# Patient Record
Sex: MALE | Race: WHITE
[De-identification: names, ages, dates, MRNs, and addresses within clinical notes are randomized per-mention and may not be internally consistent; named-entity substitution may affect disease eponyms.]

---

## 2017-10-02 ENCOUNTER — HOSPITAL ENCOUNTER (INPATIENT)
Dept: HOSPITAL 59 - MEDSURG | Age: 72
LOS: 1 days | Discharge: HOME | DRG: 470 | End: 2017-10-03
Attending: ORTHOPAEDIC SURGERY | Admitting: ORTHOPAEDIC SURGERY
Payer: COMMERCIAL

## 2017-10-02 DIAGNOSIS — J44.9: ICD-10-CM

## 2017-10-02 DIAGNOSIS — E11.9: ICD-10-CM

## 2017-10-02 DIAGNOSIS — E78.00: ICD-10-CM

## 2017-10-02 DIAGNOSIS — I10: ICD-10-CM

## 2017-10-02 DIAGNOSIS — F17.200: ICD-10-CM

## 2017-10-02 DIAGNOSIS — M17.11: Primary | ICD-10-CM

## 2017-10-02 LAB
ABO GROUP: (no result)
ANTIBODY SCREEN: NEGATIVE
RH TYPE: POSITIVE

## 2017-10-02 PROCEDURE — 90686 IIV4 VACC NO PRSV 0.5 ML IM: CPT

## 2017-10-02 PROCEDURE — 94761 N-INVAS EAR/PLS OXIMETRY MLT: CPT

## 2017-10-02 PROCEDURE — 86900 BLOOD TYPING SEROLOGIC ABO: CPT

## 2017-10-02 PROCEDURE — 82948 REAGENT STRIP/BLOOD GLUCOSE: CPT

## 2017-10-02 PROCEDURE — 36416 COLLJ CAPILLARY BLOOD SPEC: CPT

## 2017-10-02 PROCEDURE — 97165 OT EVAL LOW COMPLEX 30 MIN: CPT

## 2017-10-02 PROCEDURE — 85018 HEMOGLOBIN: CPT

## 2017-10-02 PROCEDURE — 86901 BLOOD TYPING SEROLOGIC RH(D): CPT

## 2017-10-02 PROCEDURE — 97116 GAIT TRAINING THERAPY: CPT

## 2017-10-02 PROCEDURE — 85014 HEMATOCRIT: CPT

## 2017-10-02 PROCEDURE — 86850 RBC ANTIBODY SCREEN: CPT

## 2017-10-02 PROCEDURE — 97110 THERAPEUTIC EXERCISES: CPT

## 2017-10-02 PROCEDURE — 0SRC069 REPLACEMENT OF RIGHT KNEE JOINT WITH OXIDIZED ZIRCONIUM ON POLYETHYLENE SYNTHETIC SUBSTITUTE, CEMENTED, OPEN APPROACH: ICD-10-PCS | Performed by: ORTHOPAEDIC SURGERY

## 2017-10-02 RX ADMIN — DOCUSATE SODIUM SCH MG: 100 TABLET, FILM COATED ORAL at 22:19

## 2017-10-02 RX ADMIN — FERROUS SULFATE TAB 325 MG (65 MG ELEMENTAL FE) SCH MG: 325 (65 FE) TAB at 22:19

## 2017-10-03 LAB
HCT VFR BLD CALC: 39.3 % (ref 42–52)
HGB BLD-MCNC: 13.6 GM/DL (ref 14–18)

## 2017-10-03 RX ADMIN — VANCOMYCIN HYDROCHLORIDE SCH MLS/60 MIN: 1 INJECTION, POWDER, LYOPHILIZED, FOR SOLUTION INTRAVENOUS at 00:13

## 2017-10-03 RX ADMIN — VANCOMYCIN HYDROCHLORIDE SCH MLS/60 MIN: 1 INJECTION, POWDER, LYOPHILIZED, FOR SOLUTION INTRAVENOUS at 11:05

## 2017-10-03 RX ADMIN — FERROUS SULFATE TAB 325 MG (65 MG ELEMENTAL FE) SCH MG: 325 (65 FE) TAB at 10:41

## 2017-10-03 RX ADMIN — DOCUSATE SODIUM SCH MG: 100 TABLET, FILM COATED ORAL at 10:41

## 2017-10-03 NOTE — REHAB EVALUATION
Patient Information





- Patient Information


Diagnosis: Right knee OA


Ordered Treatment: PT Evaluate and Treat


Status: Initial Evaluation


Surgery: Yes (TKA right )


Date of Surgery: 10/02/17


Past Medical/Surgical Hx: 


 PAST MEDICAL/SURGICAL HISTORY





Past Surgical History            back sx


                                 testicle sx


                                 ulcer-portion of stomach 1/3 removed 1973;


                                 colonoscopies x3;


                                 cysto with removal of kidney stone (stent)


                                 skin CA removed from left forehead.





PMH - Respiratory





Hx Respiratory Disorders         Yes


Hx Chronic Obstructive           Yes: "slight"


Pulmonary Disease (COPD)         


Hx Pneumonia                     Yes: x2


Hx of URI                        Yes: had sinus infection with URI-took ABX-


                                 almost 100% over it


Hx of SOB                        Yes: exertional


Comment:                         sinus drainage in am





PMH - Cardiovascular





Hx Cardiovascular Disorders      Yes


Hx Edema                         Yes: slight in both legs/ankles


Hx Hypertension                  Yes: med good control


Hx Vascular Disease              Yes: "leaky valve in rt leg" -DX 2 yrs ago"I


                                 have a cool rt foot"


Exercise Tolerance               Fair


Comment:                         hyperlipidemia





PMH - Neuro





Hx Neurological Disorders        Yes


Hx Dementia                      Yes: a little


Hx Neuropathy                    Yes: feet & hands


Hx Weakness                      Yes: both knees





PMH - GI





Hx Gastrointestinal Disorders    Yes


Hx Ulcer                         Yes: 1/3 stomach removed


Hx Weight Loss/Weight Gain       Yes: loss of 20# over last yr





PMH - 





Hx Genitourinary Disorders       Yes


Hx Kidney Stones                 Yes


Comment:                         stomach surgery 1973





PMH - Endocrine





Hx Endocrine Disorders           Yes


Hx Diabetes                      Yes


Hx Thyroid Disease               No


Hx of NIDDM                      Yes


Comment:                         does accucheck 2xday-this AM was 153. gets low


                                 at nite(60).





PMH - Musculoskeletal





Hx Musculoskeletal Disorders     Yes


Hx Arthritis                     Yes: all over





PMH - Psych





Hx Psychiatric Problems          No





PMH - Hematology/Oncology





Hx Hematology/Oncology           Yes


Disorders                        


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








Precautions: Universal, Fall





- Time With Patient


Total Time Spent With Patient (Min): 30


Treatment Procedures: Detail (Patient seen bedside, sitting up in bed and says 

has no pain. Removed cryocuff and bandage had leaked through with blood so 

notified nursing then checked patient's ROM and doing quite well with that and 

very little swelling in knee. Nursing removed IV and patient able to move 

supine to sit to stand with only CGA and walker. Able to stand actually without 

walker also as putting very little pressure through UE for weightbearing. 

Ambulated into [place] about 50 feet then able to descend three steps with walker 

folded and rail, pivoted around and able to ascend three steps with proper 

technique and walker and rail again for support. Walked back to room and had 

patient use bathroom: able to urinate standing without difficulty, washed hands 

easily then back to sit on edge of bed. Did not go through all exercises yet as 

nursing waiting to change bandages on knee and put new TOBIAS hose on patient. 

Left tray table and call light close.)





Subjective Information





- Subjective Information


Per Patient





Objective Data





- Pain


Pain Present: Yes


Pain Intensity: 2





- Mental Status


Patient Orientation: Oriented x3





- Visual Perception


Appears within normal limits for therapeutic activities





- ROM


Within normal limits (except right knee 0 to 60 degrees today.)





- Strength/Tone


Within normal limits (except quads probably 3+/5 yet secondary to surgery)





- Coordination


Appears within normal limits for therapeutic activities





- Bed Mobility


Independent





- Transfers


Independent





- Balance


Balance Sitting: Good


Balance Standing: Good





- Sensation


Intact





- Gait


Detail (Able to walker with FWW without any difficulty and barely puts any 

pressure on walker with UES.)





- Special Tests


No





Therapy Assessment





- Therapy Assessment


Detail (Patient doing extremely well and should progress well for rehab.)





Patient Education





- Patient Education


Teaching Topic: Equipment Use, Exercise/Activity


Response: Return Demonstration


Teaching Method: Discussion, Demonstration


Teaching Recipient: Patient


Barriers To Learning: None





Problem List





- Problem List


Physical Therapy Problem List: Detail (Some issues with mobility, gait and 

function yet especially for community distances of gait.)





Goals





- Goals


Physical Therapy Goals: Independent with gait, mobility and functional 

activities safe to go home.





Prognosis





- Prognosis


Good (Doing extremely well already and should be able to go home this afternoon 

or evening.)





Plan





- Plan


Physical Therapy Plan: Continue PT today another time then has an appointment 

scheduled for tomorrow am if needs it. Increase mobility, review exercises and 

make sure ready for discharge home.

## 2017-10-03 NOTE — PHYSICAL THERAPY TX NOTE
Physical Therapy Tx Note





- Treatment Note


Tolerated: Good (Patient still doing very well with pain control and up several 

times today to bathroom without increased pain. Able to walk with FWW with good 

technique and tolerance.)


Total Time Spent With Patient: 30


Physical Therapy Tx Note: Detail (Patient seen bedside, sitting up in bed with 

right leg dangling without pain. Removed cryocuff and compressive stocking then 

sit to stand with FWW, patient independent. Ambulated with  FWW about 100 feet 

in [place] then back to bed. Climbed in bed independently then worked on exercises

: heel slides, SLR, SAQ, ankle pumps and isometrics for quads and gluts, 

hamstrings. Re-attached cryocuff and answered questions for home.)


Physical Therapy Problem List: Detail (Some issues with mobility, gait and 

function yet especially for community distances of gait.)


Physical Therapy Goals: Independent with gait, mobility and functional 

activities safe to go home.


Prognosis: Good (Patient doing very well and safe to go home as long as family 

present and uses walker: a little bit wobbly without walker yet.)


Physical Therapy Plan: Continue PT today another time then has an appointment 

scheduled for tomorrow am if needs it. Increase mobility, review exercises and 

make sure ready for discharge home.

## 2017-10-03 NOTE — REHAB EVALUATION
Patient Information





- Patient Information


Diagnosis: Right knee OA


Ordered Treatment: OT Evaluate and Treat


Status: Initial Evaluation


Surgery: Yes (TKA right )


Date of Surgery: 10/02/17


Past Medical/Surgical Hx: 


 PAST MEDICAL/SURGICAL HISTORY





Past Surgical History            back sx


                                 testicle sx


                                 ulcer-portion of stomach 1/3 removed 1973;


                                 colonoscopies x3;


                                 cysto with removal of kidney stone (stent)


                                 skin CA removed from left forehead.





PMH - Respiratory





Hx Respiratory Disorders         Yes


Hx Chronic Obstructive           Yes: "slight"


Pulmonary Disease (COPD)         


Hx Pneumonia                     Yes: x2


Hx of URI                        Yes: had sinus infection with URI-took ABX-


                                 almost 100% over it


Hx of SOB                        Yes: exertional


Comment:                         sinus drainage in am





PMH - Cardiovascular





Hx Cardiovascular Disorders      Yes


Hx Edema                         Yes: slight in both legs/ankles


Hx Hypertension                  Yes: med good control


Hx Vascular Disease              Yes: "leaky valve in rt leg" -DX 2 yrs ago"I


                                 have a cool rt foot"


Exercise Tolerance               Fair


Comment:                         hyperlipidemia





PMH - Neuro





Hx Neurological Disorders        Yes


Hx Dementia                      Yes: a little


Hx Neuropathy                    Yes: feet & hands


Hx Weakness                      Yes: both knees





PMH - GI





Hx Gastrointestinal Disorders    Yes


Hx Ulcer                         Yes: 1/3 stomach removed


Hx Weight Loss/Weight Gain       Yes: loss of 20# over last yr





PMH - 





Hx Genitourinary Disorders       Yes


Hx Kidney Stones                 Yes


Comment:                         stomach surgery 1973





PMH - Endocrine





Hx Endocrine Disorders           Yes


Hx Diabetes                      Yes


Hx Thyroid Disease               No


Hx of NIDDM                      Yes


Comment:                         does accucheck 2xday-this AM was 153. gets low


                                 at nite(60).





PMH - Musculoskeletal





Hx Musculoskeletal Disorders     Yes


Hx Arthritis                     Yes: all over





PMH - Psych





Hx Psychiatric Problems          No





PMH - Hematology/Oncology





Hx Hematology/Oncology           Yes


Disorders                        


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








Premorbid Status: Detail (Pt lives with spouse, step daughter and 2 

grandchildren in a 1 story house with basement, he usually stays on the 1st 

floor.  He has 3-4 steps at the entrance with handrailing, a tub/shower 

combination as well as a walk in shower with grab bar and shower seat.  He has 

an elevated toilet seat with grab bar.  He is responsible for yard work, his 

family is responsible for home mgmt, meal prep and laundry.  He has a 2 wheeled 

walker, scooter, cane and crutches.)


Social History: Detail (Supportive family)


Precautions: Universal, Fall





- Time With Patient


Total Time Spent With Patient (Min): 30


Treatment Procedures: Detail (OT eval low complexity)





Subjective Information





- Subjective Information


Per Patient





Objective Data





- Pain


Pain Present: No





- Mental Status


Patient Orientation: Oriented x3





- Visual Perception


Appears within normal limits for therapeutic activities





- ROM


Within normal limits (Jl UE AROM WNL)





- Strength/Tone


Within normal limits (Jl UE strength WNL)





- Coordination


Appears within normal limits for therapeutic activities





- Bed Mobility


Independent (Ind with supine to/from sit)





- Transfers


Independent





- Balance


Balance Sitting: Good


Balance Standing: Good





- Sensation


Intact





- ADL's/IADL's


Detail (Pt educated re: modified dressing technique and he was able to 

demonstrate Ind with donning sweatpants, t-shirt and slippers.  Reviewed shower 

safety and pt verbalized understanding.)





Therapy Assessment





- Therapy Assessment


Detail (Pt is safe and Ind with modified dressing techniques.)





Problem List





- Problem List


Physical Therapy Problem List: Detail (Some issues with mobility, gait and 

function yet especially for community distances of gait.)


Occupational Therapy Problem List: Detail (No current OT problems identified.)





Goals





- Goals


Physical Therapy Goals: Independent with gait, mobility and functional 

activities safe to go home.


Occupational Therapy Goals: No current OT goals identified.





Prognosis





- Prognosis


Good





Plan





- Plan


Physical Therapy Plan: Continue PT today another time then has an appointment 

scheduled for tomorrow am if needs it. Increase mobility, review exercises and 

make sure ready for discharge home.


Occupational Therapy Plan: No further IP OT needs identified. Thank you for 

this referral.

## 2017-10-04 NOTE — OPERATIVE NOTE
DATE OF SURGERY:  10/02/2017



PREOPERATIVE DIAGNOSIS:  End-stage right knee arthrosis.



POSTOPERATIVE DIAGNOSIS:  End-stage right knee arthrosis.



OPERATION:  Right total knee arthroplasty.



Surgeon:  Lawson Monge MD



Anesthesia:  Spinal.



COMPLICATIONS:  None.



Anesthesia Provider: GABBIE Lisa



Blood Loss:  Minimal.



TOURNIQUET TIME:  75 minutes.



OPERATIVE FINDINGS:  Bone-on-bone medial compartment arthrosis.



COMPONENTS PLACED:  Two g vancomycin-cemented [employer] Journey II Oxinium size 7 femoral 
component, size 7 tibial baseplate and 9 mm thick tibial poly insert and 35 mm cemented patellar 
component.



INDICATIONS FOR OPERATION:  This is a 71-year-old male who has had persistent pain and dysfunction 
in his knee for several years.  Failed nonoperative treatment.  Scheduled for a knee replacement.  I 
explained the risks and benefits of surgery in detail for the diagnosis and procedures, including, 
but not limited to infection, nerve injury, vessel injury, persistent pain symptoms, tingling in the 
knee, periprosthetic fracture, need for resection arthroplasty should components get infected or 
loosen, injury to vessels or blood clot, and need for anticoagulation to prevent blood clots, risks 
associated with the medication, and all of his questions were clearly answered, course was outlined 
and he agreed to proceed. 



PROCEDURE: The patient brought to the OR, placed in the supine position, prepped for surgery. After 
this, spinal anesthesia was induced. The right lower extremity and knee prepped and draped in 
sterile fashion. Prepped the right knee again with ChloraPrep and draped and intraoperative timeout 
was performed. Next, the leg was exsanguinated with Esmarch. The knee was flexed and tourniquet 
inflated to 250 mmHg pressure. Next, skin and subcutaneous tissues dissected down, incised the 
capsule medially along the medial border of the patella to the tibial tubercle. Incised the vastus 
medialis in line with its fibers, in a mid vastus approach.  Partially resected the right patellar 
fat pad, and elevated the capsule subperiosteal medially and flexed. The knee had bone-on-bone 
medial compartment arthrosis. Next, drilled an intercondylar drill hole, inserted intramedullary 
guide judy, 6 degree cutting block, aligned distal femoral condyles and pinned in the +2 mm position 
and cut the distal femoral condyles. 



Next, we placed a sizing jig on the distal femoral condyle, sized to be a size 7. Through the 
previously-placed pin holes, placed the size 7 cutting jig dialed in the anterior cut so it would 
come out flush without notching, and we cut the anterior cut, and it was a good cut.   Next, we 
placed a size cutting jig, then we crosspinned and completed fixation, cut the remainder of the 
chamfer cuts in usual fashion. Next, we placed the size 7 trial component, centered it, pinned it, 
removed osteophytes off the periphery, and then inserted the femoral resection Hortencia and reamed 
out with box osteotome, cruciate bone block.



Next, attention was turned to the tibia. We seated the spikes with the external alignment jig in the 
tubercle groove, 2 fingerbreadths distally off the anterior tibial cortex.  Then, referencing for a 
7 mm cut off the higher lateral plateau there, we pinned the cutting jig provisionally and placed 2 
anterior/posterior pins. Next, checked the cutting jig alignment using the drop judy, again, 
centering on the tibial anatomic access.  Once we had good alignment, then we crosspinned the 
cutting jig, completing its fixation and cut the tibia.



Next, we removed osteophytes of posterior femoral condyles, checked flexion and extension gaps. 
Symmetric flexion and extension gaps were fine with a 9 mm thick poly insert and extension, and with 
alignment rods centered on the hip joint and ankle joint, there was normal anatomic valgus 
orientation.



Next, we took the knee in flexion and sized the tibial baseplate to a size 7  and placed all trial 
components. Again, set the rotation tibial base plate again in extension using the alignment rods 
centered on the hip joint and ankle joint, marked electrocautery marks on the anterior tibial cortex 
off the laser marks on the tibial baseplate. Next, we measured patella to be 25 mm, set the cutting 
jig at 16 mm to allow for a 9 mm thick insert. We cut the patella. Chamfered off lateral patellar 
facet as much as possible, remeasured right on 16, sized to be 35 mm medialized as much as possible 
and drilled 3 peg holes.  Placed trial patellar component and then did a trial range of motion and 
we mixed cement. 



Patella tracked nicely with full extension and flexion to 140-150 degrees, and again, symmetrical 
flexion-extension gaps were found. Next, we put the knee in flexion, seated the tibial baseplate off 
he previously-placed electrocautery marks, pinned it in place, then reamed out and keel punched a 
keel hole, placed a bone plug in femoral canal hole. Next, we irrigated copiously all bone inserts 
with pulse lavage and antibiotic solution.  Brought in clean sheet, changed gloves, and then back on 
the tibial component first, removing excess cement, and then the femoral component and removed 
excess cement, then placed the trial tibial polyliner and held the knee in extension until the 
cement hardened, then clamped down the patellar component. Once the cement hardened, we put the knee 
in flexion, removed the trial tibial component, distracted with knee bone hook and sponge, 
thoroughly debrided all excess cement around the edges of the components.



We irrigated copiously.   We then injected several needle sticks of a mixture of 0.5% Marcaine with 
epi, 2 grams tranexamicacid, and Exparel  throughout the posterior and medial capsule, medial and 
lateral periosteum, vastus medialis, and subcutaneous area around the periphery.



Next, then inserted the real tibial poly insert and verified it was interlocked medially and 
laterally, Final range of motion and stability were the same.  Irrigated copiously.  Closed the 
vastus medialis and capsule with running #2 Quill, irrigated again, and closed the skin deep with 
2-0 Vicryl and reapproximated the skin edges with zip line. We then placed sterile dressing and 
Aquacel. 



The patient tolerated the procedure well. No intraoperative complications. Sponge and needle counts 
correct. To recovery room stable. Will be discharged to the floor and likely discharged in 1-2 days. 


KEESHA

## 2019-04-15 ENCOUNTER — HOSPITAL ENCOUNTER (OUTPATIENT)
Dept: HOSPITAL 59 - SUR | Age: 74
LOS: 1 days | Discharge: HOME HEALTH | End: 2019-04-16
Attending: ORTHOPAEDIC SURGERY
Payer: COMMERCIAL

## 2019-04-15 DIAGNOSIS — J44.9: ICD-10-CM

## 2019-04-15 DIAGNOSIS — E78.00: ICD-10-CM

## 2019-04-15 DIAGNOSIS — M17.11: Primary | ICD-10-CM

## 2019-04-15 DIAGNOSIS — E11.9: ICD-10-CM

## 2019-04-15 DIAGNOSIS — H40.9: ICD-10-CM

## 2019-04-15 DIAGNOSIS — Z79.01: ICD-10-CM

## 2019-04-15 DIAGNOSIS — Z79.4: ICD-10-CM

## 2019-04-15 DIAGNOSIS — I10: ICD-10-CM

## 2019-04-15 LAB
ABO GROUP: (no result)
ANTIBODY SCREEN: NEGATIVE
RH TYPE: POSITIVE

## 2019-04-15 PROCEDURE — 86850 RBC ANTIBODY SCREEN: CPT

## 2019-04-15 PROCEDURE — 94760 N-INVAS EAR/PLS OXIMETRY 1: CPT

## 2019-04-15 PROCEDURE — 36416 COLLJ CAPILLARY BLOOD SPEC: CPT

## 2019-04-15 PROCEDURE — 82948 REAGENT STRIP/BLOOD GLUCOSE: CPT

## 2019-04-15 PROCEDURE — 85014 HEMATOCRIT: CPT

## 2019-04-15 PROCEDURE — 85018 HEMOGLOBIN: CPT

## 2019-04-15 PROCEDURE — 86901 BLOOD TYPING SEROLOGIC RH(D): CPT

## 2019-04-15 PROCEDURE — 86900 BLOOD TYPING SEROLOGIC ABO: CPT

## 2019-04-15 PROCEDURE — 76942 ECHO GUIDE FOR BIOPSY: CPT

## 2019-04-15 RX ADMIN — DOCUSATE SODIUM SCH MG: 100 TABLET, FILM COATED ORAL at 21:33

## 2019-04-15 RX ADMIN — FERROUS SULFATE TAB 325 MG (65 MG ELEMENTAL FE) SCH MG: 325 (65 FE) TAB at 21:33

## 2019-04-15 RX ADMIN — VANCOMYCIN HYDROCHLORIDE SCH MLS/60 MIN: 1 INJECTION, POWDER, LYOPHILIZED, FOR SOLUTION INTRAVENOUS at 22:40

## 2019-04-15 RX ADMIN — FERROUS SULFATE TAB 325 MG (65 MG ELEMENTAL FE) SCH MG: 325 (65 FE) TAB at 18:08

## 2019-04-15 RX ADMIN — HYDROCODONE BITARTRATE AND ACETAMINOPHEN PRN EACH: 5; 325 TABLET ORAL at 18:08

## 2019-04-15 RX ADMIN — DOCUSATE SODIUM SCH MG: 100 TABLET, FILM COATED ORAL at 18:08

## 2019-04-16 LAB
HCT VFR BLD CALC: 34.5 % (ref 42–52)
HGB BLD-MCNC: 11.9 GM/DL (ref 14–18)

## 2019-04-16 RX ADMIN — VANCOMYCIN HYDROCHLORIDE SCH MLS/60 MIN: 1 INJECTION, POWDER, LYOPHILIZED, FOR SOLUTION INTRAVENOUS at 11:30

## 2019-04-16 RX ADMIN — VANCOMYCIN HYDROCHLORIDE SCH MLS/60 MIN: 1 INJECTION, POWDER, LYOPHILIZED, FOR SOLUTION INTRAVENOUS at 12:00

## 2019-04-16 RX ADMIN — HYDROCODONE BITARTRATE AND ACETAMINOPHEN PRN EACH: 5; 325 TABLET ORAL at 12:28

## 2019-04-16 RX ADMIN — FERROUS SULFATE TAB 325 MG (65 MG ELEMENTAL FE) SCH MG: 325 (65 FE) TAB at 09:40

## 2019-04-16 RX ADMIN — DOCUSATE SODIUM SCH MG: 100 TABLET, FILM COATED ORAL at 09:40

## 2019-04-16 NOTE — REHAB EVALUATION
Patient Information





- Patient Information


Diagnosis: L knee DJD


Ordered Treatment: PT Evaluate and Treat


Status: Initial Evaluation


Surgery: Yes (L TKA)


Date of Surgery: 04/15/19


Past Medical/Surgical Hx: 


 PAST MEDICAL/SURGICAL HISTORY





Past Surgical History            RTKA 10-2-17


                                 back sx


                                 testicle sx


                                 ulcer-portion of stomach 1/3 removed 1973;


                                 colonoscopies x3;


                                 cysto with removal of kidney stone (stent)


                                 skin CA removed from left forehead.





PMH - Respiratory





Hx Respiratory Disorders         Yes


Hx Chronic Obstructive           Yes: "slight"


Pulmonary Disease (COPD)         


Hx Pneumonia                     Yes: x2


Hx of SOB                        Yes: exertional


Comment:                         sinus drainage in am





PMH - Cardiovascular





Hx Cardiovascular Disorders      Yes


Hx Edema                         Yes: slight in both legs/ankles


Hx Hypertension                  Yes:  HAS BEEN ADJUSTING HIS MEDS IMPROVING


Hx Vascular Disease              Yes: "leaky valve in rt leg" -DX 4 yrs ago"I


                                 have a cool rt foot"


Exercise Tolerance               Fair


Comment:                         hyperlipidemia. IN HOSPITAL 1-2019 FOR HTN





PMH - Neuro





Hx Neurological Disorders        Yes


Hx Dementia                      Yes: a little


Hx Neuropathy                    Yes: feet & hands


Hx Weakness                      Yes: both knees





PMH - GI





Hx Gastrointestinal Disorders    Yes


Hx Ulcer                         Yes: 1/3 stomach removed


Hx Weight Loss/Weight Gain       No: STABLE





PMH - 





Hx Genitourinary Disorders       Yes


Hx Bladder Problem               Yes: LEAKS AT TIMES


Hx Kidney Stones                 Yes: HX OF


Comment:                         stomach surgery 1973





PMH - Endocrine





Hx Endocrine Disorders           Yes


Hx Diabetes                      Yes


Hx Thyroid Disease               No


Hx of NIDDM                      Yes


Hx of IDDM                       Yes: STARTED 2-2019 STARTING TO SHOW SOME


                                 IMPROVEMENT USUALLY AROUND 200


Comment:                         BLOOD SUGARS HAVE BEEN RUNNING HIGH STARTED


                                 INSULIN 1 MONTH AGO





PMH - Musculoskeletal





Hx Musculoskeletal Disorders     Yes


Hx Arthritis                     Yes: all over





PMH - Psych





Hx Psychiatric Problems          No





PMH - Hematology/Oncology





Hx Hematology/Oncology           Yes


Disorders                        


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








Premorbid Status: Detail (Prior to surgery the patient was independent with 

mobility and independent with all ADL's.)


Social History: Detail (The patient lives with spouse in a one story house with 

3 steps at the enterance and one handrail on the L ascending the stairs. The 

bathroom is equipped with: a walk in shower with a shower bench, grab bars and 

hand held shower and an elevated toilet with grab bars. The patient has a two 

wheeled walker and 3 canes,(one single point, one single point with wide base.)


Precautions: Universal, Fall, Other (WBAT on L LE.)





- Time With Patient


Treatment Procedures: Detail (Initial Evaluation, gait training)





Subjective Information





- Subjective Information


Per Patient (The patient had complaints of L knee pain with movement " my leg 

is on fire" but did not rate his pain using 0-10 pain scale.)





Objective Data





- Mental Status


Patient Orientation: Person, Place (The patient knew birthdate but not his age. 

The patient followed simple commands but expressed confusion at times ie: with 

TKA HEP.)





- ROM


Not within normal limits (The patient's L knee AROM was limited as to be 

expected following surgery. All other AROM is WNL.)





- Strength/Tone


Not within normal limits (The patient's L LE was not tested s/p surgery however 

was functional ie: patient was able to complete SLR. The patient's R LE 

strength was functional.)





- Bed Mobility


Independent (The patient was independent with supine to and from sit transfer.)





- Transfers


Independent (The patient was independent with sit to and from stand transfer 

with occasional verbal cues to push up from and reach back for surface.)





- Balance


Balance Sitting: Good


Balance Standing: Fair (The patient had a posterior lean at times with sit to 

stand and ambulating down the stairs. The patient was able to correct posterior 

lean when cued verbal to lean forward.)





- Gait


Detail (The patient ambulated with front wheeled walker WBAT on the L LE a 

distance of 75 feet x 1 with supervision for safety only. The patient ambulated 

on 3 steps with use of folded walker and one railing with CG/supervision for 

safety using proper technique. The patient's wife was present for gait training 

and observed ambulation on stairs.)





Therapy Assessment





- Therapy Assessment


Detail (The patient was independent with bed mobility and transfers and 

required supervision for safety with ambulation on levels and stairs and 

occasional cues to correct posterior lean during movement. The patient's wife 

was aware of supervision requirements. The patient has met all inpatient goals 

and is discharged from inpatient PT. Due to stable condition PT Evaluation 

complexity is rated as low.)





Patient Education





- Patient Education


Teaching Topic: Exercise/Activity (The patient was independent with TKA HEP 

including: ankle pumps, supine heel slides, gluteal sets, quad sets, hamstring 

sets and SLR. The patient required verbal cueing to isolate hamstrings and 

quads and to maintain submaximal contraction. Patient's wife was present during 

HEP instruction.)


Response: Return Demonstration


Teaching Method: Demonstration, Handout


Teaching Recipient: Patient, Family


Barriers To Learning: Age Related, Cognitive/Verbal





Problem List





- Problem List


Physical Therapy Problem List: Detail (1) Decreased L knee AROM and strength as 

to be expected s/p surgery.)





Goals





- Goals


Physical Therapy Goals: All inpatient PT goals have been met.





Prognosis





- Prognosis


Good





Plan





- Plan


Physical Therapy Plan: The patient has met all inpatient PT goals and is 

discharged from inpatient PT. The patient is to continue with Home PT.

## 2019-04-16 NOTE — REHAB EVALUATION
Patient Information





- Patient Information


Diagnosis: L knee DJD


Ordered Treatment: OT Evaluate and Treat


Status: Initial Evaluation


Surgery: Yes (L TKA)


Date of Surgery: 04/15/19


Past Medical/Surgical Hx: 


 PAST MEDICAL/SURGICAL HISTORY





Past Surgical History            RTKA 10-2-17


                                 back sx


                                 testicle sx


                                 ulcer-portion of stomach 1/3 removed 1973;


                                 colonoscopies x3;


                                 cysto with removal of kidney stone (stent)


                                 skin CA removed from left forehead.





PMH - Respiratory





Hx Respiratory Disorders         Yes


Hx Chronic Obstructive           Yes: "slight"


Pulmonary Disease (COPD)         


Hx Pneumonia                     Yes: x2


Hx of SOB                        Yes: exertional


Comment:                         sinus drainage in am





PMH - Cardiovascular





Hx Cardiovascular Disorders      Yes


Hx Edema                         Yes: slight in both legs/ankles


Hx Hypertension                  Yes:  HAS BEEN ADJUSTING HIS MEDS IMPROVING


Hx Vascular Disease              Yes: "leaky valve in rt leg" -DX 4 yrs ago"I


                                 have a cool rt foot"


Exercise Tolerance               Fair


Comment:                         hyperlipidemia. IN HOSPITAL 1-2019 FOR HTN





PMH - Neuro





Hx Neurological Disorders        Yes


Hx Dementia                      Yes: a little


Hx Neuropathy                    Yes: feet & hands


Hx Weakness                      Yes: both knees





PMH - GI





Hx Gastrointestinal Disorders    Yes


Hx Ulcer                         Yes: 1/3 stomach removed


Hx Weight Loss/Weight Gain       No: STABLE





PMH - 





Hx Genitourinary Disorders       Yes


Hx Bladder Problem               Yes: LEAKS AT TIMES


Hx Kidney Stones                 Yes: HX OF


Comment:                         stomach surgery 1973





PMH - Endocrine





Hx Endocrine Disorders           Yes


Hx Diabetes                      Yes


Hx Thyroid Disease               No


Hx of NIDDM                      Yes


Hx of IDDM                       Yes: STARTED 2-2019 STARTING TO SHOW SOME


                                 IMPROVEMENT USUALLY AROUND 200


Comment:                         BLOOD SUGARS HAVE BEEN RUNNING HIGH STARTED


                                 INSULIN 1 MONTH AGO





PMH - Musculoskeletal





Hx Musculoskeletal Disorders     Yes


Hx Arthritis                     Yes: all over





PMH - Psych





Hx Psychiatric Problems          No





PMH - Hematology/Oncology





Hx Hematology/Oncology           Yes


Disorders                        


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








Premorbid Status: Detail (Prior to surgery the patient was independent with 

mobility and all ADL's.  The Pt had a past medical history of a R TKA 2 years 

prior as well as dementia.)


Social History: Detail (The patient lives with spouse in a one story house with 

3 steps at the enterance and one handrail on the L ascending the stairs. The 

bathroom is equipped with a walk in shower a shower bench, grab bars and hand 

held shower and an elevated toilet with grab bars. The patient has a two 

wheeled walker and 3 canes (one single point, one single point with wide base).)


Precautions: Universal, Fall, Other (WBAT on L LE.)





- Time With Patient


Total Time Spent With Patient (Min): 18





Subjective Information





- Subjective Information


Per Patient (Spouse present for eval, reports she can assist as needed for ADLs 

at home.)





Objective Data





- Pain


Pain Present: No





- Mental Status


Patient Orientation: Oriented x3 (Pt demos slight dementia - forgetfulness.)





- Visual Perception


Appears within normal limits for therapeutic activities





- ROM


Within normal limits





- Strength/Tone


Within normal limits





- Coordination


Appears within normal limits for therapeutic activities





- Bed Mobility


Independent (supine >< sit EOB)





- Transfers


Needs Assist (CGA for functional ambulation with FWW EOB >< standard toilet: v/

c for safe FWW use, compensatory technique (weight bearing thru arms during fxl 

amb.) and hand placement with sit-stand; Pt demos slight posteroir lean with 

light therapist correct during standing pant mgmt. Spouse educated on Pt 

deficits and verbalizes understanding/ability to assist upon DC home.)





- Balance


Balance Sitting: Good


Balance Standing: Good (Slight posterior lean with dynamic standing balance, v/

c and light physical assist to correct.), Fair





- Sensation


Intact





- ADL's/IADL's


Detail (OT educated Pt and spouse on shower safety - sitting with GB vs. 

standing, and modified technique for LB dressing, Pt demos understanding with 

light MIN assist from spouse who verbalizes she will assist upon DC.)





Therapy Assessment





- Therapy Assessment


Detail (Pt demos ability to complete ADLs with light MIN assist from spouse who 

reports she will assist at home as needed.  Spouse educated on Pt's deficits 

and verbalizes ability to assist. Pt appears safe to return home with spouse 

upon medical stability.)





Problem List





- Problem List


Physical Therapy Problem List: Detail (1) Decreased L knee AROM and strength as 

to be expected s/p surgery.)


Occupational Therapy Problem List: Detail (No further inpatient OT needs/goals 

identified, DC inpatient OT services.)





Goals





- Goals


Physical Therapy Goals: All inpatient PT goals have been met.


Occupational Therapy Goals: No further inpatient OT needs/goals identified, MS 

inpatient OT services.





Plan





- Plan


Physical Therapy Plan: The patient has met all inpatient PT goals and is 

discharged from inpatient PT. The patient is to continue with Home PT.


Occupational Therapy Plan: No further inpatient OT needs/goals identified, DC 

inpatient OT services.  Thank you for this referral.

## 2019-04-16 NOTE — OPERATIVE NOTE
DATE OF SURGERY:  04/15/2019



PREOPERATIVE DIAGNOSIS:  END-STAGE LEFT KNEE ARTHROSIS.



POSTOPERATIVE DIAGNOSIS:  END-STAGE LEFT KNEE ARTHROSIS.



PROCEDURE:  LEFT TOTAL KNEE ARTHROPLASTY.



SURGEON:  EMANUEL KOWALSKI M.D. 



ANESTHESIA:  SPINAL, GENERAL. LOLLY REYES CRNA 



COMPLICATIONS:  NONE.



BLOOD LOSS:  MINIMAL. 



TOURNIQUET TIME:  APPROXIMATELY 60 MINUTES. 



OPERATIVE FINDINGS:  Severe bone-on-bone arthrosis.



COMPONENTS PLACED:  2 gm Vancomycin, cemented [employer] II Oxinium 
total knee arthroplasty system size 7 femoral component, size 7 tibial baseplate
, a 9 mm thick tibial poly insert, and a 35 mm cemented patellar component. 



INDICATIONS FOR OPERATION:  This is a 73-year-old male who is well known 
myself. He is status post right knee arthroplasty done a few months ago and now 
is scheduled for the left. I explained the risks and benefits thoroughly in 
detail for the diagnosis and procedures including but not limited to infection, 
nerve injury, vessel injury, persistent pain, stiffness, numbness and tingling 
in his knee, periprosthetic fracture, need for resection arthroplasty should 
the components become infected or loosened, blood clot, need for 
anticoagulation to prevent blood clots and the risks associated with these 
medications and all of his questions were answered. Rehab and course were 
outlined and he agreed to proceed.



PROCEDURE: The patient was brought into the O.R. and placed in the supine 
position prior to surgery. General tracheal anesthesia was induced after a 
spinal anesthesia was induced and his left lower extremity and knee were 
prepped and draped in sterile fashion. The left knee was prepped again with 
ChloraPrep after it was draped. Intraoperative time-out was performed.  



The knee was injected with 0.5% Marcaine with Epinephrine. A standard anterior 
approach was performed to the knee. The leg was exsanguinated with an  Esmarch. 
The knee was flexed and the tourniquet inflated to 215 mmHg pressure. 



Next, the skin and subcutaneous tissues were dissected down. Medial and lateral 
skin flaps were made. We incised the capsule medially around the medial border 
of the of the patella to the tibial tubercle. We incised the vastus medialis in 
line with its fibers in a mid vastus approach. We partially resected the 
retropatellar fat pad and everted the patella. We elevated the capsule 
subperiosteally and medially and resected the anterior horn of the meniscus. 



Next, we drilled an intercondylar drill hole and inserted the intramedullary 
guide judy with 6 degree cutting block. We aligned off the distal femoral 
condyles. We pinned it in +2 mm position. 



He had severe bone-on-bone arthrosis throughout all three compartments. 



Next, we placed the sizing jig on the distal femur and sized it to be be right 
on size 7. Through the pre-placed pinholes, we placed the 5-in-1 cutting jig. 
We dialed in the anterior cut so it would come out flush without notching. We 
cut that cut and it was a good flush cut. We cut the remainder of the chamfer 
cuts in the usual fashion. 



Next, we placed the size 7 trial femoral component. We centered it, pinned it, 
removed osteophytes off the periphery, and then inserted the resection collet 
and reamed out and box-osteotomed out with a cruciate bone block.  



Next, attention was turned to the tibia. We placed the the extra-alignment jig. 
We seated the spikes in the intertubercular groove two fingerbreadths distally 
off the central third of the tibial tubercle and referenced for a 7 mm cut off 
the higher lateral plateau. We pinned the cutting jig provisionally with two 
anterior and posterior pins.



Next, we rechecked the alignment of the cutting jig using a drop judy centered 
on the tibial anatomic axis, cross-pinned it completing its fixation and cut 
the tibia. 



Next, we removed osteophytes from the posterior femoral condyle. We checked 
flexion and extension gaps. We basically had symmetric flexion and extension 
gaps we found with a 9 mm thick poly insert. There was 2 to 3 mm of varus/
valgus laxity in flexion and extension. Overall alignment in extension was in 
anatomic valgus orientation with the alignment judy centered on the hip joint 
and ankle joint. 



Next, we took the knee into flexion. We sized the tibial baseplate to be a size 
7. We replaced all trial components. We set the rotation again in extension 
with the alignment judy centered on the hip joint and ankle joint. We marked 
with electrocautery marks off the laser marks on the tibial baseplate with the 
alignment judy centered on the hip joint and ankle joint. 



Next, attention was turned to the patella. We measured the patella to be 25 mm. 
We set the cutting jig to 16 mm to allow for a 9 mm thick poly insert and cut 
the patella. We chamfered off the lateral patellar facet and measured it to be 
35. We medialized as much as possible and drilled three peg holes. We placed 
the trial patella component, mixed cement, and did a trial range of motion. The 
patella tracked nicely hands-free. He had full extension and flexion to 140 to 
150 degrees. He had symmetric flexion and extension gaps. 



Next, we took the knee in flexion. We seated the tibial baseplate off the 
previously placed electrocautery marks, pinned it in place and drilled out and 
keel-punched the keel hole. 



Next, we changed gloves and brought in clean sheets. We copiously irrigated 
with pulse lavage and antibiotic solution and pre-coated both surfaces. We 
impacted down the tibial component first, removing excess cement. We placed a 
bone plug in the femoral canal hole and impacted down the femoral component. WE 
placed the trial tibial poly liner and clamped down the patella component. We 
held the knee in extension until the cement hardened. 



Once the cement hardened, we then took the knee into flexion and distracted the 
knee with bone hook and sponge. We removed excess cement around the edges of 
the components. We irrigated copiously.  We then injected our 0.5% Marcaine 
with Epinephrine, tranexamic acid, and Exparel mixture in the deep capsule 
mediolateral working out peripherally to the periosteum and out superficially 
to the vastus medialis.



Next, we inserted the real tibial poly inserted and verified it was interlocked 
medial and lateral. Final range of motion revealed the same.



We irrigated and closed in flexion using a running #2 STRATAFIX suture. We 
irrigated again and closed the skin deep with #2-0 Vicryl and injected again 
subcutaneous with 0.5% Marcaine with Epinephrine and Exparel mixture. A sterile 
dressing was applied and ACE wrap.



He tolerated the procedure well, no intraoperative complications. Sponge, needle
, and blade counts were correct. Recovery Room stable, neurovascularly intact. 
He will be discharged likely tomorrow and follow-up in two weeks. 



cc: Primary Care Physician



JOB NUMBER:  [id number]
MTDD